# Patient Record
Sex: MALE | Race: WHITE
[De-identification: names, ages, dates, MRNs, and addresses within clinical notes are randomized per-mention and may not be internally consistent; named-entity substitution may affect disease eponyms.]

---

## 2018-05-27 ENCOUNTER — HOSPITAL ENCOUNTER (EMERGENCY)
Dept: HOSPITAL 11 - JP.ED | Age: 68
Discharge: HOME | End: 2018-05-27
Payer: MEDICARE

## 2018-05-27 VITALS — SYSTOLIC BLOOD PRESSURE: 127 MMHG | DIASTOLIC BLOOD PRESSURE: 67 MMHG

## 2018-05-27 DIAGNOSIS — Z79.899: ICD-10-CM
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
## 2018-05-27 DIAGNOSIS — S01.81XA: Primary | ICD-10-CM
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
